# Patient Record
Sex: FEMALE | Race: WHITE | Employment: FULL TIME | URBAN - METROPOLITAN AREA
[De-identification: names, ages, dates, MRNs, and addresses within clinical notes are randomized per-mention and may not be internally consistent; named-entity substitution may affect disease eponyms.]

---

## 2021-12-12 ENCOUNTER — HOSPITAL ENCOUNTER (EMERGENCY)
Age: 21
Discharge: HOME | End: 2021-12-12
Payer: COMMERCIAL

## 2021-12-12 VITALS
OXYGEN SATURATION: 100 % | BODY MASS INDEX: 28.35 KG/M2 | WEIGHT: 160 LBS | HEART RATE: 99 BPM | SYSTOLIC BLOOD PRESSURE: 127 MMHG | TEMPERATURE: 97.1 F | RESPIRATION RATE: 16 BRPM | HEIGHT: 63 IN | DIASTOLIC BLOOD PRESSURE: 58 MMHG

## 2021-12-12 DIAGNOSIS — H61.21 IMPACTED CERUMEN OF RIGHT EAR: Primary | ICD-10-CM

## 2021-12-12 PROCEDURE — 6370000000 HC RX 637 (ALT 250 FOR IP): Performed by: PHYSICIAN ASSISTANT

## 2021-12-12 PROCEDURE — 99283 EMERGENCY DEPT VISIT LOW MDM: CPT

## 2021-12-12 PROCEDURE — 69209 REMOVE IMPACTED EAR WAX UNI: CPT

## 2021-12-12 RX ORDER — OFLOXACIN 3 MG/ML
5 SOLUTION AURICULAR (OTIC) 2 TIMES DAILY
Qty: 3.5 ML | Refills: 0 | Status: SHIPPED | OUTPATIENT
Start: 2021-12-12 | End: 2021-12-19

## 2021-12-12 RX ADMIN — Medication 5 DROP: at 18:18

## 2021-12-12 ASSESSMENT — PAIN DESCRIPTION - ORIENTATION: ORIENTATION: RIGHT

## 2021-12-12 ASSESSMENT — PAIN DESCRIPTION - FREQUENCY: FREQUENCY: CONTINUOUS

## 2021-12-12 ASSESSMENT — PAIN DESCRIPTION - PAIN TYPE: TYPE: ACUTE PAIN

## 2021-12-12 ASSESSMENT — PAIN DESCRIPTION - LOCATION: LOCATION: EAR

## 2021-12-12 ASSESSMENT — PAIN SCALES - GENERAL: PAINLEVEL_OUTOF10: 10

## 2021-12-12 NOTE — ED NOTES
FIRST PROVIDER CONTACT ASSESSMENT NOTE       Department of Emergency Medicine   12/12/21  5:16 PM EST    Chief Complaint: Foreign Body in Ear (Earring fell inside right ear this morning, unable to get it out)    History of Present Illness:   Cinda Meadows is a 24 y.o. female who presents to the ED for possible foreign body to right ear. Patient states the ball of her earring fell inside of her ear this morning. She is unable to get it out. She states she was trying to get it out all day and has been unsuccessful. She does report some discomfort and hearing loss. Focused Physical Exam:  VS:  BP (!) 127/58   Pulse 99   Temp 97.1 °F (36.2 °C)   Resp 16   SpO2 100%      General: Alert and in no apparent distress   Ears: No obvious FB noted to right ear canal, there is cerumen, no canal redness or edema     Medical History:  has no past medical history on file. Surgical History:  has no past surgical history on file. Social History:      Family History: family history is not on file. *ALLERGIES*     Patient has no known allergies.      Initial Plan of Care:  Initiate Treatment-Testing, Proceed toTreatment Area When Bed Available for ED Attending/MLP to Continue Care    -----------------END OF FIRST PROVIDER CONTACT ASSESSMENT NOTE--------------  Electronically signed by Ny Mar PA-C   DD: 12/12/21         Ny Mar PA-C  12/12/21 [id number]

## 2021-12-13 NOTE — ED PROVIDER NOTES
[facility]     Department of Emergency Medicine   ED  Provider Note  Admit Date/RoomTime: 12/12/2021  5:36 PM  ED Room: 35/35     Chief Complaint   Foreign Body in [address] [facility] [address] (Earring fell inside right ear this morning, unable to get it out)    History of Present Illness      Israel Mckeon is a 24 y.o. old female presenting to the emergency department for possible foreign body to right ear. Patient states the ball of her earring fell out of her ear this morning. She thinks it went into her ear and has been trying to get it out with no success. She does report some discomfort to her right ear but denies any drainage or bleeding. She denies any direct injury or trauma. She has no fever/chills, cough, congestion, sore throat, or difficulty with ambulation or balance. Patient is alert oriented x3 and in no apparent distress at this exam.  She is nontoxic-appearing. ROS   Pertinent positives and negatives are stated within HPI, all other systems reviewed and are negative. Past Medical History:   Past Medical History:   Diagnosis Date    ADHD     Depression      Past Surgical History:  has a past surgical history that includes Cholecystectomy. Social History:  reports that she has never smoked. She has never used smokeless tobacco. She reports previous alcohol use. She reports that she does not use drugs. Family History: family history is not on file. Allergies: Patient has no known allergies. Physical Exam     ED Triage Vitals   BP Temp Temp src Pulse Resp SpO2 Height Weight   12/12/21 1714 12/12/21 1714 -- 12/12/21 1714 12/12/21 1714 12/12/21 1714 12/12/21 1850 12/12/21 1850   (!) 127/58 97.1 °F (36.2 °C)  99 16 100 % 5' 2.5\" (1.588 m) 160 lb (72.6 kg)     Oxygen Saturation Interpretation: Normal.    Constitutional:  Alert and oriented x3, development consistent with age, NAD  Eyes:  EOMI, no discharge or conjunctival injection.   Ears:  External Ears: Bilateral normal.               TM's & External Canals: Left normal, rightt canal with mild edema and redness, rightt cerumen impaction with no obvious foreign body  Right ear will be irrigated  Mouth:  Mucous membranes moist without lesions, tongue and gums normal.  Throat:  Pharynx without injection, Uvula midline, Airway patient. Neck:  Supple. No lymphadenopathy. Non-tender   Integument:  Normal turgor. Warm, dry, without visible rash, unless noted elsewhere. Neurological:  Orientation age-appropriate unless noted elseware. Motor functions intact. Lab / Imaging Results   (All laboratory and radiology results have been personally reviewed by myself)  Labs:  No results found for this visit on 12/12/21. Imaging: All Radiology results interpreted by Radiologist unless otherwise noted. No orders to display     ED Course / Medical Decision Making     Medications   carbamide peroxide (DEBROX) 6.5 % otic solution 5 drop (5 drops Right Ear Given 12/12/21 1818)      Consult(s):  None    Procedure(s):  PROCEDURE  FOREIGN BODY REMOVAL  Risks, benefits and alternatives (for applicable procedures below) described. Performed By: Prateek Rasheed PA-C. Location:   Foreign body of Right ear    Informed consent: by patient or legal gardian. Skin Prep:  none  required. Anesthetic: not required. The patient's head was positioned appropriately and the foreign body was not identified. Cerumen irrigated out with no difficulty or complications. Complications: none. Patient tolerated the procedure well. Right ear irrigated with sterile saline  Cerumen removed with no difficulty  Mild canal redness, no obvious FN noted      MDM:       Counseling: The emergency provider has spoken with the patient/caregiver and discussed todays results, in addition to providing specific details for the plan of care and counseling regarding the diagnosis and prognosis. Questions are answered at this time and they are agreeable with the plan.   Patient understand that she must follow-up with pediatrician as needed or if patient develops fever/chills, ear pain, or ear discharge/drainage. They were advised on signs and symptoms that would require emergent return to the ED. Patient was in no distress at discharge. Vitals stable. Patient was able to ambulate out of department with no difficulty or complaints of dizziness. Assessment      1. Impacted cerumen of right ear      Plan   Discharge to home  Patient condition is good    New Medications     Discharge Medication List as of 12/12/2021  6:45 PM      START taking these medications    Details   ofloxacin (FLOXIN OTIC) 0.3 % otic solution Place 5 drops into the right ear 2 times daily for 7 days TO AFFECTED EAR, Disp-3.5 mL, R-0Print           Electronically signed by Aarti Griffith PA-C   DD: 12/12/21    **This report was transcribed using voice recognition software. Every effort was made to ensure accuracy; however, inadvertent computerized transcription errors may be present.   END OF ED PROVIDER NOTE        Aarti Griffith PA-C  12/12/21 [id number]